# Patient Record
Sex: FEMALE | Race: WHITE | NOT HISPANIC OR LATINO | URBAN - NONMETROPOLITAN AREA
[De-identification: names, ages, dates, MRNs, and addresses within clinical notes are randomized per-mention and may not be internally consistent; named-entity substitution may affect disease eponyms.]

---

## 2018-12-12 PROBLEM — Z98.42: Noted: 2018-12-12

## 2018-12-12 PROBLEM — Z96.1: Noted: 2019-01-25

## 2018-12-12 PROBLEM — Z98.41: Noted: 2018-12-12

## 2019-01-25 ENCOUNTER — IMPORTED ENCOUNTER (OUTPATIENT)
Dept: URBAN - NONMETROPOLITAN AREA CLINIC 1 | Facility: CLINIC | Age: 74
End: 2019-01-25

## 2019-01-25 PROCEDURE — 99024 POSTOP FOLLOW-UP VISIT: CPT

## 2019-05-22 ENCOUNTER — IMPORTED ENCOUNTER (OUTPATIENT)
Dept: URBAN - NONMETROPOLITAN AREA CLINIC 1 | Facility: CLINIC | Age: 74
End: 2019-05-22

## 2019-05-22 PROBLEM — H43.813: Noted: 2019-05-22

## 2019-05-22 PROBLEM — Z96.1: Noted: 2019-01-25

## 2019-05-22 PROBLEM — H40.1134: Noted: 2019-05-22

## 2019-05-22 PROBLEM — H04.123: Noted: 2019-12-05

## 2019-05-22 PROBLEM — H35.372: Noted: 2019-05-22

## 2019-05-22 PROBLEM — H40.1131: Noted: 2019-12-05

## 2019-05-22 PROCEDURE — 92014 COMPRE OPH EXAM EST PT 1/>: CPT

## 2019-05-22 NOTE — PATIENT DISCUSSION
"POAG-S Based off optic nerve apparanceOrder VF 24-2 Last VF done in 02/2016PVD OU -Retina flat 360 with no breaks tears or heme.-S&S of RD/RT reviewed with pt.-Stressed that pt should contact our office right away with any changes or increase in symptoms. s/p PCIOL stand/trad OD 12/11/18s/p PCIOL stand/trad OS 11/13/18RX GIVENMONITORDES-Explained ELMER and associated symptoms.-Continue Restasis BID OU -Pt to begin artificial tears OU QID PRN. Pt will contact us if this does not provide relief. Consider punctal plugs in that case. ""RTC 4 months dilation prior to appt VF 24-2"

## 2019-11-06 ENCOUNTER — IMPORTED ENCOUNTER (OUTPATIENT)
Dept: URBAN - NONMETROPOLITAN AREA CLINIC 1 | Facility: CLINIC | Age: 74
End: 2019-11-06

## 2019-11-06 PROCEDURE — 92083 EXTENDED VISUAL FIELD XM: CPT

## 2019-12-05 ENCOUNTER — IMPORTED ENCOUNTER (OUTPATIENT)
Dept: URBAN - NONMETROPOLITAN AREA CLINIC 1 | Facility: CLINIC | Age: 74
End: 2019-12-05

## 2019-12-05 PROCEDURE — 92014 COMPRE OPH EXAM EST PT 1/>: CPT

## 2019-12-05 NOTE — PATIENT DISCUSSION
"POAG-S Based off optic nerve apparancestble vfstable iop w/o gttsPVD OU -Retina flat 360 with no breaks tears or heme.-S&S of RD/RT reviewed with pt.-Stressed that pt should contact our office right away with any changes or increase in symptoms. s/p PCIOL stand/trad OD 12/11/18s/p PCIOL stand/trad OS 11/13/18RX GIVENMONITORDES-Explained ELMER and associated symptoms.-Continue Restasis BID OU -Pt to begin artificial tears OU QID PRN. Pt will contact us if this does not provide relief. Consider punctal plugs in that case. ""pt to call for restasis if desires"

## 2020-06-04 ENCOUNTER — IMPORTED ENCOUNTER (OUTPATIENT)
Dept: URBAN - NONMETROPOLITAN AREA CLINIC 1 | Facility: CLINIC | Age: 75
End: 2020-06-04

## 2020-06-04 PROCEDURE — 92012 INTRM OPH EXAM EST PATIENT: CPT

## 2020-06-04 NOTE — PATIENT DISCUSSION
"POAGBased off optic nerve apparancestble vfstable iop w/o gtts-monitor for changesPVD OU longstanding-Retina flat 360 with no breaks tears or heme.-S&S of RD/RT reviewed with pt.-Stressed that pt should contact our office right away with any changes or increase in symptoms. s/p PCIOL stand/trad OD 12/11/18s/p PCIOL stand/trad OS 11/13/18RX GIVENMONITORDES-Explained ELMER and associated symptoms.-Continue Restasis BID OU -Pt to begin artificial tears OU QID PRN. Pt will contact us if this does not provide relief. Consider punctal plugs in that case. ""pt to call for restasis if desires"

## 2020-12-03 ENCOUNTER — IMPORTED ENCOUNTER (OUTPATIENT)
Dept: URBAN - NONMETROPOLITAN AREA CLINIC 1 | Facility: CLINIC | Age: 75
End: 2020-12-03

## 2020-12-03 PROCEDURE — 92133 CPTRZD OPH DX IMG PST SGM ON: CPT

## 2020-12-03 PROCEDURE — 92014 COMPRE OPH EXAM EST PT 1/>: CPT

## 2020-12-03 NOTE — PATIENT DISCUSSION
"POAGBased off optic nerve apparancestble oct todaystable iop w/o gtts-monitor for iop and vf changesPVD OU longstanding-Retina flat 360 with no breaks tears or heme.-S&S of RD/RT reviewed with pt.-Stressed that pt should contact our office right away with any changes or increase in symptoms. s/p PCIOL stand/trad OD 12/11/18s/p PCIOL stand/trad OS 11/13/18RX GIVENMONITORDES-Explained ELMER and associated symptoms.-Continue Restasis BID OU -Pt to begin artificial tears OU QID PRN. Pt will contact us if this does not provide relief. Consider punctal plugs in that case. ""pt to call for restasis if desires"

## 2021-06-16 ENCOUNTER — IMPORTED ENCOUNTER (OUTPATIENT)
Dept: URBAN - NONMETROPOLITAN AREA CLINIC 1 | Facility: CLINIC | Age: 76
End: 2021-06-16

## 2021-06-16 PROCEDURE — 92083 EXTENDED VISUAL FIELD XM: CPT

## 2021-06-16 PROCEDURE — 99213 OFFICE O/P EST LOW 20 MIN: CPT

## 2021-06-16 NOTE — PATIENT DISCUSSION
"POAGBased off optic nerve apparancevf today stable oustable iop w/o gtts-monitor for iop and vf changesPVD OU longstanding-Retina flat 360 with no breaks tears or heme.-S&S of RD/RT reviewed with pt.-Stressed that pt should contact our office right away with any changes or increase in symptoms. s/p PCIOL stand/trad OD 12/11/18s/p PCIOL stand/trad OS 11/13/18DES-Explained ELMER and associated symptoms.-Continue Restasis BID OU -Pt to begin artificial tears OU QID PRN. Pt will contact us if this does not provide relief. Consider punctal plugs in that case. ""pt to call for restasis if desiresptosis /dermatochalasis oueducate ptrefer for ptosis eval"

## 2022-01-12 ENCOUNTER — PREPPED CHART (OUTPATIENT)
Dept: RURAL CLINIC 1 | Facility: CLINIC | Age: 77
End: 2022-01-12

## 2022-01-12 ENCOUNTER — IMPORTED ENCOUNTER (OUTPATIENT)
Dept: URBAN - NONMETROPOLITAN AREA CLINIC 1 | Facility: CLINIC | Age: 77
End: 2022-01-12

## 2022-01-12 PROCEDURE — 92014 COMPRE OPH EXAM EST PT 1/>: CPT

## 2022-01-12 PROCEDURE — 92133 CPTRZD OPH DX IMG PST SGM ON: CPT

## 2022-02-22 ASSESSMENT — TONOMETRY
OD_IOP_MMHG: 16
OS_IOP_MMHG: 16

## 2022-02-22 ASSESSMENT — VISUAL ACUITY
OS_CC: 20/30
OD_CC: 20/20

## 2022-02-23 ENCOUNTER — CONSULTATION/EVALUATION (OUTPATIENT)
Dept: RURAL CLINIC 1 | Facility: CLINIC | Age: 77
End: 2022-02-23

## 2022-02-23 DIAGNOSIS — H02.413: ICD-10-CM

## 2022-02-23 PROCEDURE — 95060 OPH MUCOUS MEMBRANE TESTS: CPT

## 2022-02-23 PROCEDURE — 99241 LIMITED: PROBLEM FOCUSED - E/M: CPT

## 2022-02-23 ASSESSMENT — VISUAL ACUITY
OS_CC: 20/25
OD_CC: 20/25

## 2022-02-23 ASSESSMENT — TONOMETRY
OS_IOP_MMHG: 15
OD_IOP_MMHG: 15

## 2022-02-23 NOTE — PATIENT DISCUSSION
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 Valium only 10 mg 1 tab per JS Discussed RBA. Need to call patients PCP to ask about d/c ASA prior to surgery. Pt elects to proceed. Zara in [facility] . MRD 0.5 OD 1 OS BLF 14 OU TBUT 13 secs OU (-) LAG OU.

## 2022-03-08 ENCOUNTER — DIAGNOSTICS ONLY (OUTPATIENT)
Dept: RURAL CLINIC 1 | Facility: CLINIC | Age: 77
End: 2022-03-08

## 2022-03-08 DIAGNOSIS — H02.413: ICD-10-CM

## 2022-03-08 PROCEDURE — 92082 INTERMEDIATE VISUAL FIELD XM: CPT

## 2022-03-08 NOTE — PATIENT DISCUSSION
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 Valium only 10 mg 1 tab per JS Discussed RBA. Need to call patients PCP to ask about d/c ASA prior to surgery. Pt elects to proceed. Zara in [facility] . MRD 0.5 OD 1 OS BLF 14 OU TBUT 13 secs OU (-) LAG OU.

## 2022-04-09 ASSESSMENT — TONOMETRY
OD_IOP_MMHG: 12
OS_IOP_MMHG: 14
OS_IOP_MMHG: 12
OD_IOP_MMHG: 16
OS_IOP_MMHG: 16
OD_IOP_MMHG: 14
OD_IOP_MMHG: 16
OS_IOP_MMHG: 15
OS_IOP_MMHG: 12
OD_IOP_MMHG: 12
OS_IOP_MMHG: 13
OS_IOP_MMHG: 12
OD_IOP_MMHG: 12
OD_IOP_MMHG: 12

## 2022-04-09 ASSESSMENT — VISUAL ACUITY
OD_CC: J2
OS_SC: 20/25-1
OS_CC: 20/25
OS_SC: 20/20-1
OD_CC: 20/30+
OS_CC: 20/25
OS_SC: 20/25
OD_CC: 20/30
OD_SC: 20/40-
OD_SC: 20/20
OD_SC: 20/25
OS_SC: 20/25
OS_CC: J2
OS_SC: 20/30-1
OD_SC: 20/25
OD_SC: 20/20

## 2022-06-22 ENCOUNTER — SURGERY/PROCEDURE (OUTPATIENT)
Dept: RURAL CLINIC 1 | Facility: CLINIC | Age: 77
End: 2022-06-22

## 2022-06-22 DIAGNOSIS — H02.413: ICD-10-CM

## 2022-06-22 PROCEDURE — 1582250

## 2022-06-22 NOTE — PATIENT DISCUSSION
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 Valium only 10 mg 1 tab per JS Discussed RBA. Need to call patients PCP to ask about d/c ASA prior to surgery. Pt elects to proceed. Zara in [facility] . MRD 0.5 OD 1 OS BLF 14 OU TBUT 13 secs OU (-) LAG OU.

## 2022-06-22 NOTE — PROCEDURE NOTE: SURGICAL
"<p><strong>Procedure:<span>&nbsp;&nbsp;&nbsp;&nbsp;&nbsp;&nbsp;&nbsp;&nbsp;&nbsp;&nbsp;&nbsp;&nbsp;&nbsp;&nbsp;&nbsp;&nbsp;&nbsp;&nbsp;&nbsp;&nbsp;&nbsp;&nbsp;&nbsp;&nbsp;&nbsp;&nbsp;&nbsp;&nbsp;&nbsp;&nbsp;&nbsp;&nbsp;&nbsp;&nbsp;&nbsp;&nbsp;&nbsp;&nbsp; </span>Repair of Ptosis, Right upper eyelid</strong></p><p><strong><span>&nbsp;&nbsp;&nbsp;&nbsp;&nbsp;&nbsp;&nbsp;&nbsp;&nbsp;&nbsp;&nbsp;&nbsp;&nbsp;&nbsp;&nbsp;&nbsp;&nbsp;&nbsp;&nbsp;&nbsp;&nbsp;&nbsp;&nbsp;&nbsp;&nbsp;&nbsp;&nbsp;&nbsp;&nbsp;&nbsp;&nbsp;&nbsp;&nbsp;&nbsp;&nbsp; </span><span>&nbsp;&nbsp;&nbsp;&nbsp;&nbsp;&nbsp;&nbsp;&nbsp;&nbsp;&nbsp;&nbsp;&nbsp;&nbsp;&nbsp;&nbsp;&nbsp;&nbsp;&nbsp;&nbsp;&nbsp;&nbsp; </span></strong></p><p><strong><span>&nbsp;&nbsp;&nbsp;&nbsp;&nbsp;&nbsp;&nbsp;&nbsp;&nbsp;&nbsp;&nbsp;&nbsp;&nbsp;&nbsp;&nbsp;&nbsp;&nbsp;&nbsp;&nbsp;&nbsp;&nbsp;&nbsp;&nbsp;&nbsp;&nbsp;&nbsp;&nbsp;&nbsp;&nbsp;&nbsp;&nbsp;&nbsp;&nbsp;&nbsp;&nbsp;&nbsp;&nbsp;&nbsp;&nbsp;&nbsp;&nbsp;&nbsp;&nbsp;&nbsp;&nbsp;&nbsp;&nbsp; </span><span>&nbsp;&nbsp;&nbsp;&nbsp;&nbsp;&nbsp;&nbsp;&nbsp;&nbsp; </span></strong></p><p><strong>&nbsp;</strong></p><p><strong>Pre- Operative Diagnosis:<span>&nbsp;&nbsp;&nbsp;&nbsp;&nbsp;&nbsp;&nbsp;&nbsp;&nbsp;&nbsp;&nbsp;&nbsp;&nbsp; </span>Visually Significant Ptosis, Right upper eyelid </strong></p><p><strong><span>&nbsp;&nbsp;&nbsp;&nbsp;&nbsp;&nbsp;&nbsp;&nbsp;&nbsp;&nbsp;&nbsp;&nbsp;&nbsp;&nbsp;&nbsp;&nbsp;&nbsp;&nbsp;&nbsp;&nbsp;&nbsp;&nbsp;&nbsp;&nbsp;&nbsp;&nbsp;&nbsp;&nbsp;&nbsp;&nbsp;&nbsp;&nbsp;&nbsp;&nbsp;&nbsp;&nbsp;&nbsp;&nbsp;&nbsp;&nbsp;&nbsp;&nbsp;&nbsp;&nbsp;&nbsp;&nbsp;&nbsp;&nbsp;&nbsp;&nbsp;&nbsp;&nbsp;&nbsp;&nbsp;&nbsp;&nbsp;&nbsp; </span></strong></p><p><strong>Post- Operative Diagnosis:<span>&nbsp;&nbsp; </span><span>&nbsp;&nbsp;&nbsp;&nbsp;&nbsp;&nbsp;&nbsp;&nbsp;&nbsp; </span>Same</strong></p><p><strong>&nbsp;</strong></p><p><span style=""font-size:9.0pt;"">Prior to surgery a list of risks and complications including but not limited to infection

## 2022-07-06 ENCOUNTER — POST-OP (OUTPATIENT)
Dept: RURAL CLINIC 1 | Facility: CLINIC | Age: 77
End: 2022-07-06

## 2022-07-06 DIAGNOSIS — Z98.890: ICD-10-CM

## 2022-07-06 PROCEDURE — 99024 POSTOP FOLLOW-UP VISIT: CPT

## 2022-07-06 ASSESSMENT — VISUAL ACUITY
OS_SC: 20/40
OD_SC: 20/25-2

## 2022-07-06 NOTE — PATIENT DISCUSSION
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 Valium only 10 mg 1 tab per JS Discussed RBA. Need to call patients PCP to ask about d/c ASA prior to surgery. Pt elects to proceed. Zara in [facility] . MRD 0.5 OD 1 OS BLF 14 OU TBUT 13 secs OU (-) LAG OU.

## 2022-08-19 ENCOUNTER — FOLLOW UP (OUTPATIENT)
Dept: RURAL CLINIC 1 | Facility: CLINIC | Age: 77
End: 2022-08-19

## 2022-08-19 DIAGNOSIS — H04.123: ICD-10-CM

## 2022-08-19 DIAGNOSIS — Z96.1: ICD-10-CM

## 2022-08-19 DIAGNOSIS — H43.813: ICD-10-CM

## 2022-08-19 DIAGNOSIS — Z98.890: ICD-10-CM

## 2022-08-19 DIAGNOSIS — H40.1131: ICD-10-CM

## 2022-08-19 PROCEDURE — 92083 EXTENDED VISUAL FIELD XM: CPT

## 2022-08-19 PROCEDURE — 99213 OFFICE O/P EST LOW 20 MIN: CPT

## 2022-08-19 ASSESSMENT — VISUAL ACUITY
OS_CC: 20/25
OD_CC: 20/25

## 2022-08-19 ASSESSMENT — TONOMETRY
OS_IOP_MMHG: 14
OD_IOP_MMHG: 14

## 2023-09-28 ENCOUNTER — FOLLOW UP (OUTPATIENT)
Dept: RURAL CLINIC 1 | Facility: CLINIC | Age: 78
End: 2023-09-28

## 2023-09-28 DIAGNOSIS — Z96.1: ICD-10-CM

## 2023-09-28 DIAGNOSIS — Z98.890: ICD-10-CM

## 2023-09-28 DIAGNOSIS — H43.813: ICD-10-CM

## 2023-09-28 DIAGNOSIS — H16.223: ICD-10-CM

## 2023-09-28 DIAGNOSIS — H40.1131: ICD-10-CM

## 2023-09-28 PROCEDURE — 99214 OFFICE O/P EST MOD 30 MIN: CPT

## 2023-09-28 PROCEDURE — 92083 EXTENDED VISUAL FIELD XM: CPT

## 2023-09-28 ASSESSMENT — VISUAL ACUITY
OU_CC: 20/25-2
OD_CC: 20/25
OS_CC: 20/25-1

## 2023-09-28 ASSESSMENT — TONOMETRY
OD_IOP_MMHG: 14
OS_IOP_MMHG: 15

## 2024-03-27 ENCOUNTER — ESTABLISHED PATIENT (OUTPATIENT)
Dept: RURAL CLINIC 1 | Facility: CLINIC | Age: 79
End: 2024-03-27

## 2024-03-27 DIAGNOSIS — Z96.1: ICD-10-CM

## 2024-03-27 DIAGNOSIS — H40.1131: ICD-10-CM

## 2024-03-27 DIAGNOSIS — H43.813: ICD-10-CM

## 2024-03-27 DIAGNOSIS — Z98.890: ICD-10-CM

## 2024-03-27 DIAGNOSIS — H16.223: ICD-10-CM

## 2024-03-27 PROCEDURE — 92014 COMPRE OPH EXAM EST PT 1/>: CPT

## 2024-03-27 ASSESSMENT — VISUAL ACUITY
OD_CC: 20/25
OS_CC: 20/20-2
OD_CC: 20/20
OS_CC: 20/25
OU_CC: 20/25
OU_CC: 20/20

## 2024-03-27 ASSESSMENT — TONOMETRY
OS_IOP_MMHG: 13
OD_IOP_MMHG: 13

## 2024-09-25 ENCOUNTER — FOLLOW UP (OUTPATIENT)
Dept: RURAL CLINIC 1 | Facility: CLINIC | Age: 79
End: 2024-09-25

## 2024-09-25 DIAGNOSIS — H40.1131: ICD-10-CM

## 2024-09-25 DIAGNOSIS — H16.223: ICD-10-CM

## 2024-09-25 DIAGNOSIS — Z98.890: ICD-10-CM

## 2024-09-25 DIAGNOSIS — H43.813: ICD-10-CM

## 2024-09-25 DIAGNOSIS — Z96.1: ICD-10-CM

## 2024-09-25 PROCEDURE — 92083 EXTENDED VISUAL FIELD XM: CPT

## 2024-09-25 PROCEDURE — 99214 OFFICE O/P EST MOD 30 MIN: CPT

## 2025-03-25 ENCOUNTER — FOLLOW UP (OUTPATIENT)
Age: 80
End: 2025-03-25

## 2025-03-25 DIAGNOSIS — H16.223: ICD-10-CM

## 2025-03-25 DIAGNOSIS — H40.1131: ICD-10-CM

## 2025-03-25 DIAGNOSIS — Z98.890: ICD-10-CM

## 2025-03-25 DIAGNOSIS — Z96.1: ICD-10-CM

## 2025-03-25 DIAGNOSIS — H43.813: ICD-10-CM

## 2025-03-25 PROCEDURE — 92133 CPTRZD OPH DX IMG PST SGM ON: CPT

## 2025-03-25 PROCEDURE — 92014 COMPRE OPH EXAM EST PT 1/>: CPT
